# Patient Record
(demographics unavailable — no encounter records)

---

## 2025-03-18 NOTE — HISTORY OF PRESENT ILLNESS
[FreeTextEntry1] : 38F  recent postpartum vaginal delivery on 3/9/25 with preeclampsia with severe feature was started on nifedipine 30mg, refer for CardioOB evaluation.